# Patient Record
Sex: FEMALE | Race: WHITE | NOT HISPANIC OR LATINO | Employment: OTHER
[De-identification: names, ages, dates, MRNs, and addresses within clinical notes are randomized per-mention and may not be internally consistent; named-entity substitution may affect disease eponyms.]

---

## 2021-06-22 ENCOUNTER — TRANSCRIBE ORDERS (OUTPATIENT)
Dept: OTHER | Age: 81
End: 2021-06-22

## 2021-06-22 ENCOUNTER — TRANSFERRED RECORDS (OUTPATIENT)
Dept: HEALTH INFORMATION MANAGEMENT | Facility: CLINIC | Age: 81
End: 2021-06-22

## 2021-06-22 DIAGNOSIS — Q75.8 BASILAR INVAGINATION: Primary | ICD-10-CM

## 2021-06-22 DIAGNOSIS — M06.9 RA (RHEUMATOID ARTHRITIS) (H): ICD-10-CM

## 2021-06-24 ENCOUNTER — TELEPHONE (OUTPATIENT)
Dept: NEUROSURGERY | Facility: CLINIC | Age: 81
End: 2021-06-24

## 2021-06-24 NOTE — TELEPHONE ENCOUNTER
Writer RADHA for pt to call back and schedule appt regarding neurosurgery referral.    Please schedule the next available in person appt with Dr. Pimentel. Please also ask pt where they recent imaging and records are located and message writer.    Leia Rucker

## 2021-06-24 NOTE — TELEPHONE ENCOUNTER
[facility] Call Center    Phone Message    May a detailed message be left on voicemail: no     Reason for Call: Other: Patricia stated the Arin had a CT and MRI completed within the last 3 weeks at [facility] and her records are with [facility].     Action Taken: Message routed to:  [facility] ([facility] PEDS NEUROSURGERY    Travel Screening: Not Applicable

## 2021-06-25 ENCOUNTER — PRE VISIT (OUTPATIENT)
Dept: NEUROSURGERY | Facility: CLINIC | Age: 81
End: 2021-06-25

## 2021-06-25 NOTE — TELEPHONE ENCOUNTER
Action 1:15PM  6.25.21   Action Taken Writer faxed request to [facility] at [phone] for medical records and notes. Writer faxed request to [facility] at [phone] requesting recent CT and MRI     Action 8:42AM  7/8/21   Action Taken Writer spoke with Sasha at [facility], she did not see request for records, Sasha confirmed she will send records via fax by the end of the day. Writer spoke with Rep at [facility] who provided new fax number of [phone], writer sent fax requesting MRI and CT     Action 8:17AM  7.16.21   Action Taken Writer confirmed pt CT and MRI are in PACS. Progress notes from [facility] is located in Media tab

## 2021-08-31 ENCOUNTER — OFFICE VISIT (OUTPATIENT)
Dept: NEUROSURGERY | Facility: CLINIC | Age: 81
End: 2021-08-31
Attending: NEUROLOGICAL SURGERY
Payer: MEDICARE

## 2021-08-31 VITALS
HEIGHT: 60 IN | WEIGHT: 106.92 LBS | DIASTOLIC BLOOD PRESSURE: 66 MMHG | BODY MASS INDEX: 20.99 KG/M2 | HEART RATE: 90 BPM | SYSTOLIC BLOOD PRESSURE: 109 MMHG

## 2021-08-31 DIAGNOSIS — M47.12 CERVICAL SPONDYLOSIS WITH MYELOPATHY: Primary | ICD-10-CM

## 2021-08-31 DIAGNOSIS — Q75.8 BASILAR INVAGINATION: ICD-10-CM

## 2021-08-31 PROCEDURE — G0463 HOSPITAL OUTPT CLINIC VISIT: HCPCS

## 2021-08-31 PROCEDURE — 99204 OFFICE O/P NEW MOD 45 MIN: CPT | Performed by: NEUROLOGICAL SURGERY

## 2021-08-31 RX ORDER — DIPHENOXYLATE HYDROCHLORIDE AND ATROPINE SULFATE 2.5; .025 MG/1; MG/1
1 TABLET ORAL
COMMUNITY
Start: 2021-03-01

## 2021-08-31 RX ORDER — FLUDROCORTISONE ACETATE 0.1 MG/1
TABLET ORAL
COMMUNITY
Start: 2021-03-31

## 2021-08-31 RX ORDER — DULOXETIN HYDROCHLORIDE 30 MG/1
CAPSULE, DELAYED RELEASE ORAL
COMMUNITY
Start: 2019-09-23

## 2021-08-31 RX ORDER — FERROUS SULFATE 325(65) MG
325 TABLET ORAL
COMMUNITY
Start: 2021-03-01

## 2021-08-31 RX ORDER — POTASSIUM CHLORIDE 1500 MG/1
20 TABLET, EXTENDED RELEASE ORAL
COMMUNITY
Start: 2021-03-31

## 2021-08-31 ASSESSMENT — MIFFLIN-ST. JEOR: SCORE: 870.88

## 2021-08-31 NOTE — PATIENT INSTRUCTIONS
You met with Pediatric Neurosurgery at the [facility]    [address] Dr., NP      Pediatric Appointment Scheduling and Call Center:   [phone]    Nurse Practitioner  [phone]    Mailing Address  [address] [address]    Street Address   [address] [place] [id number]    Fax Number  [phone]    For urgent matters that cannot wait until the next business day, occur over a holiday and/or weekend, report directly to your nearest ER or you may call [phone] and ask to page the Pediatric Neurosurgery Resident on call.

## 2021-08-31 NOTE — LETTER
8/31/2021      RE: Arin Griffin  [address] [address]       Pediatric Neurosurgery Clinic    Dear Dr. Rodriguez,   Thank you for referring Arin Griffin to the pediatric neurosurgery clinic at the [facility]. I had the opportunity to meet with Arin Griffin and parents on August 31, 2021.    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 She reports that she has had shooting pains in her neck every time she moves that originally started a few months ago, got slightly better and then she reports over the past 2 weeks her pain has been unbearable. She otherwise denies any overt neck pain, just soreness. She tends to look down, because whenever she looks straight ahead, and especially upwards, she gets very dizzy. She first noticed this a few months ago when she was standing to brush her teeth, she looked up, was immediately dizzy and nauseous and passed out. She states that she has rheumatoid arthritis and is very stiff. She reports numbness and tingling in bilateral upper extremities which is worse in the RUE, denies any numbness/tingling in lower extremities. She denies any swallowing issues or concerns, although clears her throat often. She denies any snoring. She had a pilonidal cyst on her coccyx and continues with an open wound (since November 2020), which she does hyperbaric chamber and wound care three times per week. Of note, she has had bilateral knee and hip replacements, and was on Prednisone for 20 years for arthritis.     No past medical history on file.    No past surgical history on file.    ALLERGIES:  Patient has no known  "allergies.    Current Outpatient Medications   Medication Sig Dispense Refill     DULoxetine (CYMBALTA) 30 MG capsule Take 1 capsule by mouth once daily       ferrous sulfate (FEROSUL) 325 (65 Fe) MG tablet Take 325 mg by mouth       fludrocortisone (FLORINEF) 0.1 MG tablet Take 1 tablet by mouth once daily       Multiple Vitamin (MULTI-VITAMINS) TABS Take 1 tablet by mouth       potassium chloride ER (KLOR-CON M) 20 MEQ CR tablet Take 20 mEq by mouth         No family history on file.    Social History     Tobacco Use     Smoking status: Not on file   Substance Use Topics     Alcohol use: Not on file       On review of systems, a 10 point ROS of systems including Constitutional, Eyes, Respiratory, Cardiovascular, Gastroenterology, Genitourinary, Integumentary, Muscularskeletal, Psychiatric were all negative except for pertinent positives noted in my HPI.     PHYSICAL EXAM:   /66 (BP Location: Right arm, Patient Position: Sitting, Cuff Size: Adult Regular)   Pulse 90   Ht 1.515 m (4' 11.65\")   Wt 48.5 kg (106 lb 14.8 oz)   BMI 21.13 kg/m    Alert and oriented to person, place, and time. No acute distress, hard of hearing and sitting in wheelchair   PERRL, EOMI. Face symmetric. Tongue midline.   Unable to lift arms long enough to assess pronator drift  Weak and severely limited grasp and strengths in BUE/BLE  Gait belt on, able to walk short distances with assist, per family independent at home  Sensation intact throughout  Back: No cutaneous stigmata of occult spinal dysraphism. 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     This patient was discussed with neurosurgery faculty, who agrees with the above.  Jessica Wills NP on 9/7/2021 at 9:05 AM      Attestation signed by Garth Pimentel MD at 9/8/2021  3:13 PM:  Physician Attestation   I, Garth Pimentel, saw and evaluated Arin Griffin as part of a shared visit.  I have reviewed and discussed with the advanced practice provider their history, physical and plan.    I personally reviewed the vital signs, medications, labs, and imaging.    My key history or physical exam findings: It was a pleasure meeting Arin and her daughter in neurosurgery clinic today.  This is an 81-year-old with several conditions including rheumatoid arthritis and basilar invagination.  She was referred here for discussion, evaluation and possible surgical management of her cord and brainstem compression.  She has had shooting pains in her neck every time that she moves, and she is unable to look up.  She looks down to avoid the pain.  She also develops dizziness with neck extension.  This has been going on for several months.  She has even had syncope during these episodes.  She has had severe rheumatoid arthritis for several years and, although she is able to ambulate, spends much of her time in a wheelchair.  She is difficulty using her hands.  She has been on chronic steroids for 20 years.  She has numbness and tingling in her bilateral upper extremities.  She does have a large pilonidal cyst  on her coccyx for which she receives hyperbaric oxygen therapy and wound packing.  This is been there since November 2020.  She has had bilateral knee and hip replacements.  I reviewed her most recent imaging studies with her daughter and her.  She had a magnetic resonance imaging study done of her cervical spine on June 14, 2021.  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  She also had a head CT done on June 4, 2021.  She has no hydrocephalus.  She has stenosis at the level of C to 3, mostly from posterior compression.  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  She does not have significant ventral compression from this mass.  She has atlas assimilation bilaterally.  The CT images go down beyond C2 and they show fusion mass of both occipital condyles with both lateral masses of C1.  She also has fusion of her odontoid process with the arch of C1 anteriorly and to the left.  She has bony compression posteriorly which appears to be from the posterior arch of C1 mainly.    Key management decisions made by me: We discussed options.  We discussed the option of doing nothing and living with the symptoms.  She is having significant pain and absolutely says that she is unable to live with the symptoms.  We also discussed the option of pain management only, which could include meeting with a pain specialist, who might prescribe things such as narcotics or perhaps even an injection.  We also discussed the option of surgical decompression.  I do believe that many of her symptoms are due from her severe compression and it is worrisome that she has had syncope and that she is unable to extend her head.  I am worried that management of pain only would put her at risk for upper cervical spine injury. We discussed that surgery would include an incision of the back of the head and neck, exposure of the bones, and removal of bone at the base of the skull  and the back of the upper spine.  I do not think she would need a fusion given it looks like she is already fused from occiput to C2.  Also, during the exam, she had quite limited neck range of motion particularly with neck rotation and flexion extension.  I discussed with other colleagues and think it is reasonable to offer surgery.  We did discuss that her risk of a surgical complication is much higher than the average person we do this procedure on given her age and chronic steroid use.  Would like to have her evaluated in the anesthesia clinic for preoperative risk factors.  It is concerning that she is having trouble healing her sacral wound and we discussed the really we are real possible complication of poor wound healing.    Garth Pimentel  Date of Service (when I saw the patient): 8/31/2021

## 2021-08-31 NOTE — NURSING NOTE
"Chief Complaint   Patient presents with     New Patient     Rheumatioid arthritis       /66 (BP Location: Right arm, Patient Position: Sitting, Cuff Size: Adult Regular)   Pulse 90   Ht 4' 11.65\" (151.5 cm)   Wt 106 lb 14.8 oz (48.5 kg)   BMI 21.13 kg/m      Kathy Alston, EMT  August 31, 2021  "

## 2021-09-07 NOTE — PROGRESS NOTES
Pediatric Neurosurgery Clinic    Dear Dr. Rodriguez,   Thank you for referring Arin Griffin to the pediatric neurosurgery clinic at the [facility]. I had the opportunity to meet with Arin Griffin and parents on August 31, 2021.    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 She reports that she has had shooting pains in her neck every time she moves that originally started a few months ago, got slightly better and then she reports over the past 2 weeks her pain has been unbearable. She otherwise denies any overt neck pain, just soreness. She tends to look down, because whenever she looks straight ahead, and especially upwards, she gets very dizzy. She first noticed this a few months ago when she was standing to brush her teeth, she looked up, was immediately dizzy and nauseous and passed out. She states that she has rheumatoid arthritis and is very stiff. She reports numbness and tingling in bilateral upper extremities which is worse in the RUE, denies any numbness/tingling in lower extremities. She denies any swallowing issues or concerns, although clears her throat often. She denies any snoring. She had a pilonidal cyst on her coccyx and continues with an open wound (since November 2020), which she does hyperbaric chamber and wound care three times per week. Of note, she has had bilateral knee and hip replacements, and was on Prednisone for 20 years for arthritis.     No past medical history on file.    No past surgical history on file.    ALLERGIES:  Patient has no known allergies.    Current Outpatient Medications   Medication Sig Dispense Refill     DULoxetine  "(CYMBALTA) 30 MG capsule Take 1 capsule by mouth once daily       ferrous sulfate (FEROSUL) 325 (65 Fe) MG tablet Take 325 mg by mouth       fludrocortisone (FLORINEF) 0.1 MG tablet Take 1 tablet by mouth once daily       Multiple Vitamin (MULTI-VITAMINS) TABS Take 1 tablet by mouth       potassium chloride ER (KLOR-CON M) 20 MEQ CR tablet Take 20 mEq by mouth         No family history on file.    Social History     Tobacco Use     Smoking status: Not on file   Substance Use Topics     Alcohol use: Not on file       On review of systems, a 10 point ROS of systems including Constitutional, Eyes, Respiratory, Cardiovascular, Gastroenterology, Genitourinary, Integumentary, Muscularskeletal, Psychiatric were all negative except for pertinent positives noted in my HPI.     PHYSICAL EXAM:   /66 (BP Location: Right arm, Patient Position: Sitting, Cuff Size: Adult Regular)   Pulse 90   Ht 1.515 m (4' 11.65\")   Wt 48.5 kg (106 lb 14.8 oz)   BMI 21.13 kg/m    Alert and oriented to person, place, and time. No acute distress, hard of hearing and sitting in wheelchair   PERRL, EOMI. Face symmetric. Tongue midline.   Unable to lift arms long enough to assess pronator drift  Weak and severely limited grasp and strengths in BUE/BLE  Gait belt on, able to walk short distances with assist, per family independent at home  Sensation intact throughout  Back: No cutaneous stigmata of occult spinal dysraphism. 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     This patient was discussed with neurosurgery faculty, who agrees with the above.  eJssica Wills NP on 9/7/2021 at 9:05 AM

## 2021-09-08 ENCOUNTER — PREP FOR PROCEDURE (OUTPATIENT)
Dept: NEUROSURGERY | Facility: CLINIC | Age: 81
End: 2021-09-08

## 2021-09-08 DIAGNOSIS — Q75.8 BASILAR INVAGINATION: Primary | ICD-10-CM

## 2021-09-08 DIAGNOSIS — M48.02 SPINAL STENOSIS IN CERVICAL REGION: ICD-10-CM

## 2021-09-08 DIAGNOSIS — G95.9 MYELOPATHY (H): Primary | ICD-10-CM

## 2021-09-08 DIAGNOSIS — G95.9 MYELOPATHY (H): ICD-10-CM

## 2022-02-22 ENCOUNTER — TELEPHONE (OUTPATIENT)
Dept: NEUROSURGERY | Facility: CLINIC | Age: 82
End: 2022-02-22
Payer: MEDICARE

## 2022-02-22 NOTE — TELEPHONE ENCOUNTER
Writer RADHA for pt daughter to call back and schedule a follow up    Please schedule a return, in person or virtual visit with Dr. Pimentel for next available    Leia Rucker